# Patient Record
Sex: FEMALE | URBAN - METROPOLITAN AREA
[De-identification: names, ages, dates, MRNs, and addresses within clinical notes are randomized per-mention and may not be internally consistent; named-entity substitution may affect disease eponyms.]

---

## 2017-10-12 ENCOUNTER — HOSPITAL ENCOUNTER (OUTPATIENT)
Facility: MEDICAL CENTER | Age: 82
End: 2017-10-12

## 2017-10-12 ENCOUNTER — HOSPITAL ENCOUNTER (OUTPATIENT)
Facility: MEDICAL CENTER | Age: 82
End: 2017-10-12
Attending: INTERNAL MEDICINE

## 2017-10-12 NOTE — PROGRESS NOTES
Tele Med scheduled and set up for 1000am.     RICHY Mccabe at [facility] aware of the time.

## 2017-10-12 NOTE — PROGRESS NOTES
Hospital Medicine Consult    Date of Service  10/12/2017    Chief Complaint  Shortness of breath and cough    History of Presenting Illness  89 y.o. female who presented 10/12/2017 with shortness of breath and fatigue for about 3 weeks. Also orthopneic and VINSON.  No chest pain or diaphoresis reported. Admitted to [facility] and diagnosed with atrial fibrillation with RVR, community acquired pneumonia and treated with dilt drip, dilt PO, carvedilol, doses of Lasix, Xarelto, Rocephin 1g BID and oxygen. Vitals notable for tachycardia, tachypnea and hypoxia. Labs notable for BNP 25294, elevated creatinine, transaminitis. Tropoinin was negative. Dr. Alphonso PA-C was managing under Dr. Villeda and Cardiology was curbsided to read the EKG.  She improved during hospital course, with downtrending creatinine and good O2Sats  When I saw her and Dr. Marilyn FARMER at videoconference, Radha Luna was pleasant and in a good mood. She may have some cognitive deficits. She however felt better with her breathing. Dr. Mccabe mentioned good respirations on auscultation, and she has no pedal edema. She was not having any chest pain or lightheadedness but she had admitted to productive coughing.    Reason for consultation  Review management of Afib and pneumonia.  Primary Care Physician  No primary care provider on file.    Consultants      Code Status      Review of Systems  ROS   Unable to obtain due to mental capacity; may have cognitive deficits.       Past Medical History  No past medical history on file.   History of UTi  On record, has not been diagnosed with CHF or Afib; no cardiac history    Surgical History  No past surgical history on file.    Medications  No current outpatient prescriptions on file prior to encounter.     No current facility-administered medications on file prior to encounter.    Reviewed with Dr. alphonso FARMER. dilt drip at 10mg/hr, dilt PO started, carvedilol, doses of Lasix but this has been  stopeed, Xarelto, Rocephin 1g BID and oxygen      Family History  No family history on file.   Reviewed not pertinent    Social History  Social History   Substance Use Topics   • Smoking status: Not on file   • Smokeless tobacco: Not on file   • Alcohol use Not on file       Allergies  Allergies not on file   Lortab     Physical Exam  Laboratory   Hemodynamics  Temp (24hrs), Avg:-17.8 °C (0 °F), Min:, Max:      No Data Recorded           Respiratory                    Physical Exam  Reviewed with Dr. Estelle PA-C who was present and I was at [facility].  Vitals SBP in the 120s but -110s. Afebrile. On O2, and is on chronic O2.  Basically improved respirations.  No lower extremity ededema according to him.  When I asked Radha Luna directly, she mentioned she felt better, she wasn;t having any difficulty breathing. She did have a cough however.          No results for input(s): ALTSGPT, ASTSGOT, ALKPHOSPHAT, TBILIRUBIN, DBILIRUBIN, GAMMAGT, AMYLASE, LIPASE, ALB, PREALBUMIN, GLUCOSE in the last 72 hours.              No results found for: TROPONINI     Reviewed labs with Dr. Mccabe, please see HPI    Urinalysis:  No results found for: SPECGRAVITY, GLUCOSEUR, KETONES, NITRITE, WBCURINE, RBCURINE, BACTERIA, EPITHELCELL     Imaging  No results found.  Reviewed admission CXR, some evidence of pulmonary edema  EKG Afib with RVR   Assessment/Plan     I anticipate this patient will need to be transferred to a higher level of care for titration and transition of diltiazem drip, heparin drip, obtaining an echo, Cardiology and Nephrology consultation and renally dosing medications    I recommend the following:    Atrial fibrillation with RVR.  Currently -110, stable hemodynamics. On dilt drip, PO dilt and carvedilol  Continue the dilt drip and titrate accordingly.Currently HR not at goal, so would continue the drip and do PO dilt only when on average HR is 120 and below. I highly recommend transfer to a dedicated  CV unit for titration of drips and Cardiology consult and manage directly (e.g. Evaluate if antiarrhythmic indicated)    Acute congestive heart failure exacerbation  BNP 38515  Continue diuresis as long as blood pressure permits and track creatinine.  Cardiology and Nephrology consultation to help manage diuresis  Obtain an echocardiogram, evaluate EF and valves    Acute respiratory failure with hypoxia  Due to CHF  On O2. Continue O2.    Congestive hepatopathy  Trend transaminases and nonurgent liver U/S to rule out intrinsic hepatocellular disease    Cardiorenal syndrome  Improved Cr- from diuresis suggest cardiorenal syndrome.   Nephrology consult would be beneficial. Pharmacy to renally dose meds. Discontinue Maalox. DISCONTINUE Xarelto and do heparin gtt no bolus instead    Community acquired pneumonia  Add doxycycline to Rocephin    VTE prophylaxis: on Xarelto but recommend changing to heparing drip no bolus.    I spent 80 minutes, reviewing the chart, notes, vitals, labs, imaging, ordering labs, evaluating Radha Luna for assessment, enacting the plan above. 50% of the time was spent in counseling Radha Luna and answering questions. Discussed with Dr. Estelle FARMER. Medical decision making is therefore complex. Time was devoted to counseling and coordinating care including review of records, pertinent lab data and studies, as well as discussing diagnostic evaluation and work up, planned therapeutic interventions and future disposition of care. Where indicated, the assessment and plan reflect discussion of patient with consultants, other healthcare providers, family members, and additional research needed to obtain further information in formulating the plan of care for Radha Luna.  Patient was presented for a telehealth consultation via secure and encrypted videoconferencing technology.   Patient was presented for a telehealth consultation via secure and encrypted videoconferencing technology.

## 2020-07-10 NOTE — CONSULTS
Hospital Medicine Consult     Date of Service  10/12/2017     Chief Complaint  Shortness of breath and cough     History of Presenting Illness  89 y.o. female who presented 10/12/2017 with shortness of breath and fatigue for about 3 weeks. Also orthopneic and VINSON.  No chest pain or diaphoresis reported. Admitted to [facility] and diagnosed with atrial fibrillation with RVR, community acquired pneumonia and treated with dilt drip, dilt PO, carvedilol, doses of Lasix, Xarelto, Rocephin 1g BID and oxygen. Vitals notable for tachycardia, tachypnea and hypoxia. Labs notable for BNP 43983, elevated creatinine, transaminitis. Tropoinin was negative. Dr. Alphonso PA-C was managing under Dr. Villeda and Cardiology was curbsided to read the EKG.  She improved during hospital course, with downtrending creatinine and good O2Sats  When I saw her and Dr. Marilyn FARMER at videoconference, Radha Luna was pleasant and in a good mood. She may have some cognitive deficits. She however felt better with her breathing. Dr. Mccabe mentioned good respirations on auscultation, and she has no pedal edema. She was not having any chest pain or lightheadedness but she had admitted to productive coughing.     Reason for consultation  Review management of Afib and pneumonia.   Primary Care Physician  No primary care provider on file.     Consultants        Code Status        Review of Systems  ROS   Unable to obtain due to mental capacity; may have cognitive deficits.      Past Medical History  No past medical history on file.   History of UTi  On record, has not been diagnosed with CHF or Afib; no cardiac history     Surgical History  No past surgical history on file.     Medications  No current outpatient prescriptions on file prior to encounter.      No current facility-administered medications on file prior to encounter.    Reviewed with Dr. alphonso FARMER. dilt drip at 10mg/hr, dilt PO started, carvedilol, doses of Lasix but this  has been stopeed, Xarelto, Rocephin 1g BID and oxygen      Family History  Family History   No family history on file.      Reviewed not pertinent     Social History       Social History   Substance Use Topics   • Smoking status: Not on file   • Smokeless tobacco: Not on file   • Alcohol use Not on file         Allergies  Allergies not on file   Lortab   Physical Exam   Laboratory   Hemodynamics  Temp (24hrs), Avg:-17.8 °C (0 °F), Min:, Max:      No Data Recorded            Respiratory                     Physical Exam  Reviewed with Dr. Estelle PA-C who was present and I was at [facility].  Vitals SBP in the 120s but -110s. Afebrile. On O2, and is on chronic O2.  Basically improved respirations.  No lower extremity ededema according to him.  When I asked Radha Luna directly, she mentioned she felt better, she wasn;t having any difficulty breathing. She did have a cough however.           No results for input(s): ALTSGPT, ASTSGOT, ALKPHOSPHAT, TBILIRUBIN, DBILIRUBIN, GAMMAGT, AMYLASE, LIPASE, ALB, PREALBUMIN, GLUCOSE in the last 72 hours.              No results found for: TROPONINI      Reviewed labs with Dr. Mccabe, please see HPI     Urinalysis:  No results found for: SPECGRAVITY, GLUCOSEUR, KETONES, NITRITE, WBCURINE, RBCURINE, BACTERIA, EPITHELCELL      Imaging  No results found.  Reviewed admission CXR, some evidence of pulmonary edema  EKG Afib with RVR   Assessment/Plan       I anticipate this patient will need to be transferred to a higher level of care for titration and transition of diltiazem drip, heparin drip, obtaining an echo, Cardiology and Nephrology consultation and renally dosing medications     I recommend the following:     Atrial fibrillation with RVR.  Currently -110, stable hemodynamics. On dilt drip, PO dilt and carvedilol  Continue the dilt drip and titrate accordingly.Currently HR not at goal, so would continue the drip and do PO dilt only when on average HR is 120 and  below. I highly recommend transfer to a dedicated CV unit for titration of drips and Cardiology consult and manage directly (e.g. Evaluate if antiarrhythmic indicated)     Acute congestive heart failure exacerbation  BNP 81216  Continue diuresis as long as blood pressure permits and track creatinine.  Cardiology and Nephrology consultation to help manage diuresis  Obtain an echocardiogram, evaluate EF and valves     Acute respiratory failure with hypoxia  Due to CHF  On O2. Continue O2.     Congestive hepatopathy  Trend transaminases and nonurgent liver U/S to rule out intrinsic hepatocellular disease     Cardiorenal syndrome  Improved Cr- from diuresis suggest cardiorenal syndrome.   Nephrology consult would be beneficial. Pharmacy to renally dose meds. Discontinue Maalox. DISCONTINUE Xarelto and do heparin gtt no bolus instead     Community acquired pneumonia  Add doxycycline to Rocephin     VTE prophylaxis: on Xarelto but recommend changing to heparing drip no bolus.     I spent 80 minutes, reviewing the chart, notes, vitals, labs, imaging, ordering labs, evaluating Radha Luna for assessment, enacting the plan above. 50% of the time was spent in counseling Radha Luna and answering questions. Discussed with Dr. Estelle FARMER. Medical decision making is therefore complex. Time was devoted to counseling and coordinating care including review of records, pertinent lab data and studies, as well as discussing diagnostic evaluation and work up, planned therapeutic interventions and future disposition of care. Where indicated, the assessment and plan reflect discussion of patient with consultants, other healthcare providers, family members, and additional research needed to obtain further information in formulating the plan of care for Radha Luna.  Patient was presented for a telehealth consultation via secure and encrypted videoconferencing technology.    Patient was presented for a telehealth consultation  via secure and encrypted videoconferencing technology.         None Done